# Patient Record
Sex: MALE | Race: OTHER | NOT HISPANIC OR LATINO | Employment: UNEMPLOYED | URBAN - METROPOLITAN AREA
[De-identification: names, ages, dates, MRNs, and addresses within clinical notes are randomized per-mention and may not be internally consistent; named-entity substitution may affect disease eponyms.]

---

## 2024-06-02 ENCOUNTER — HOSPITAL ENCOUNTER (EMERGENCY)
Facility: HOSPITAL | Age: 34
Discharge: HOME | End: 2024-06-02
Attending: STUDENT IN AN ORGANIZED HEALTH CARE EDUCATION/TRAINING PROGRAM

## 2024-06-02 VITALS
BODY MASS INDEX: 36.4 KG/M2 | RESPIRATION RATE: 16 BRPM | WEIGHT: 260 LBS | HEIGHT: 71 IN | SYSTOLIC BLOOD PRESSURE: 149 MMHG | HEART RATE: 91 BPM | OXYGEN SATURATION: 96 % | DIASTOLIC BLOOD PRESSURE: 80 MMHG | TEMPERATURE: 98 F

## 2024-06-02 DIAGNOSIS — M77.8 EXTENSOR TENDONITIS OF FOOT: Primary | ICD-10-CM

## 2024-06-02 PROCEDURE — 99283 EMERGENCY DEPT VISIT LOW MDM: CPT

## 2024-06-02 RX ORDER — NAPROXEN 500 MG/1
500 TABLET ORAL 2 TIMES DAILY WITH MEALS
Qty: 30 TABLET | Refills: 0 | Status: SHIPPED | OUTPATIENT
Start: 2024-06-02

## 2024-06-02 NOTE — ED NOTES
Patient arrived to ED via private vehicle with complaints of bottom of L foot pain. From ball of foot to heal. Reports pain started a month ago after a trip to the beach. Denies injury or trauma. Ambulatory steady gait. Pain slightly improves with advil. Pain worsens with standing for long hours.   CMS intact. + distal pulses. No swelling or edema.     APPEARANCE: Alert, oriented and in no acute distress.  CARDIAC: Normal rate and rhythm, no murmur heard.   PERIPHERAL VASCULAR: peripheral pulses present. Normal cap refill. No edema. Warm to touch.    RESPIRATORY:Normal rate and effort, breath sounds clear bilaterally throughout chest. Respirations are equal and unlabored no obvious signs of distress.  GASTRO: soft, bowel sounds normal, no tenderness, no abdominal distention.  MUSC: Full ROM. No bony tenderness or soft tissue tenderness. No obvious deformity. L foot pain.   SKIN: Skin is warm and dry, normal skin turgor, mucous membranes moist.  NEURO: 5/5 strength major flexors/extensors bilaterally. Sensory intact to light touch bilaterally. [place] coma scale: eyes open spontaneously-4, oriented & converses-5, obeys commands-6. No neurological abnormalities.   MENTAL STATUS: awake, alert and aware of environment.  EYE: PERRL, both eyes: pupils brisk and reactive to light. Normal size.  ENT: EARS: no obvious drainage. NOSE: no active bleeding.

## 2024-06-02 NOTE — DISCHARGE INSTRUCTIONS

## 2024-06-02 NOTE — ED PROVIDER NOTES
Encounter Date: 6/2/2024       History     Chief Complaint   Patient presents with    Foot Pain     Non traumatic L foot pain: bottom of foot from ball of foot to heal. X 1 month. Ambulatory steady gait. Has been taking motrin with mild relief.      34-year-old male presents to ED with concern of pain to dorsal aspect of left foot that began roughly 1 month ago.  He denies any specific injury or trauma, but noticed pain started after a trip to the beach.  Denies stepping on object.  Denies foot swelling with no skin changes or wounds.  No numbness or focal weakness.  No fevers, chills or body aches.  He has tried ibuprofen intermittently for his symptoms.  He is not diabetic.  No other acute complaints at this time.    The history is provided by the patient.     Review of patient's allergies indicates:   Allergen Reactions    Codeine Rash     No past medical history on file.  No past surgical history on file.  No family history on file.     Review of Systems   Constitutional:  Negative for chills and fever.   Musculoskeletal:  Positive for arthralgias.   Skin:  Negative for color change, rash and wound.   Neurological:  Negative for weakness and numbness.       Physical Exam     Initial Vitals [06/02/24 1742]   BP Pulse Resp Temp SpO2   (!) 149/80 91 16 97.5 °F (36.4 °C) 96 %      MAP       --         Physical Exam    Vitals reviewed.  Constitutional: He appears well-developed and well-nourished. He is active. He does not have a sickly appearance. He does not appear ill. No distress.   HENT:   Head: Normocephalic and atraumatic.   Neck:   Normal range of motion.  Musculoskeletal:      Cervical back: Normal range of motion.      Comments: Left foot tenderness extending over dorsal surface along 1st extensor tendon.  No physical or palpable deformities.  No skin changes, erythema or warmth.  Pain does worsened with extension of left great toe against resistance.  Full range motion of left ankle with no reported pain.   No significant tenderness or swelling specifically over 1st MTP or other sufficient evidence to suggest gout flare-up.     Neurological: He is alert. GCS eye subscore is 4. GCS verbal subscore is 5. GCS motor subscore is 6.   Skin: Skin is warm and dry.   Psychiatric: He has a normal mood and affect. His speech is normal and behavior is normal.         ED Course   Procedures  Labs Reviewed - No data to display       Imaging Results    None          Medications - No data to display  Medical Decision Making  Patient presents with concern of 1 month onset pain to dorsal left foot that is worse with activities and movement.  Denying skin changes or wounds.  Afebrile.  Tenderness noted over 1st extensor tendon with no palpable defects or signs of infection.    DDx:  Including but not limited to tendinitis, bursitis, gout, less likely cellulitis, abscess, septic joint               ED Course as of 06/02/24 1815   Sun Jun 02, 2024 1814 No significant evidence at this time to suggest acute infection.  I do not suspect septic joint.  Lower suspicion for gout.  There is concern for tendinitis.  Patient was offered walking boot but defers at this time.  Prescription for naproxen.  Will referred to Podiatry for further evaluation and management.  ED return precautions were discussed.  Patient states his understanding and agrees with plan. [KS]      ED Course User Index  [KS] Landen Anthony PA-C                           Clinical Impression:  Final diagnoses:  [M77.8] Extensor tendonitis of foot (Primary)          ED Disposition Condition    Discharge Stable          ED Prescriptions       Medication Sig Dispense Start Date End Date Auth. Provider    naproxen (NAPROSYN) 500 MG tablet Take 1 tablet (500 mg total) by mouth 2 (two) times daily with meals. 30 tablet 6/2/2024 -- Landen Anthony PA-C          Follow-up Information       Follow up With Specialties Details Why Contact Info    Your Doctor                 Raj  GABRIELLE Schuster  06/02/24 1813